# Patient Record
(demographics unavailable — no encounter records)

---

## 2024-10-23 NOTE — PHYSICAL EXAM
[Normal Sclera/Conjunctiva] : normal sclera/conjunctiva [No Edema] : there was no peripheral edema [No Extremity Clubbing/Cyanosis] : no extremity clubbing/cyanosis [Normal Gait] : normal gait [Normal] : affect was normal and insight and judgment were intact [No Lymphadenopathy] : no lymphadenopathy [Supple] : supple [de-identified] : thyroid enlarged [de-identified] : firm nodule to mid upper back, C/D/I no erythema or edema

## 2024-10-23 NOTE — HISTORY OF PRESENT ILLNESS
[FreeTextEntry1] : f/u [de-identified] : Patient here today for f/u   Patient reports inflammation in neck/pain, cant find trigger. She reports getting dizzy, lightheaded, and feeling flushed. She lasts few mins-hours, happening weekly. She reports happening about the same. Wants to check out.   She reports lump on back for a long time as long as she can remember (at least 1 year). She wants to know what it is   Gender Dysphoria: Patient started late jan 2024 on gaht. Has been going well, no side effects. She has experienced continued physical changes with gaht. Patient is feeling great, no side effects   SH: She reports she is making career transition, might move to [place] in several months, will keep us posted. She reports not finalized yet but will let us know if moving -- waiting on job to go through, has been delayed been in process. Partner last visited in may  Sexual Hx: Relationship status: They have been in a long-distance relationship monag relationship for a few months and are not currently sexually active. Partners (tell me about the genders and bodies of partners, any sperm producing partners): trans female Practices (types of sex, monogamous/polyamorous): n/a not currently sexually active Protections from STIs (condoms, OCP, LARC, PrEP, etc.): counseled on condoms Past Hx of STIs: none Pregnancy (Desire or Hx): n/a LMP: n/a  Patient reports 0 sexual partners in the last 3 months. Last sexual encounter was feb 2024  Pt denies any SOB, cough, chest pain, palpitations, N/V/D/C, fever, or chills. No other health concerns today.

## 2024-10-23 NOTE — PLAN
[FreeTextEntry1] : Gender Dysphoria: stable. Will order routine blood work and f/u results to patient once made available - will send [facility] message with results and dose adjustment if needed. F/u 3 months. Advised patient to call with any questions or concerns. Patient verbalized understanding.   Enlarged thyroid: obtain us to visualize. Will order routine blood work and f/u results to patient once made available. Advised patient to call with any questions or concerns. Patient verbalized understanding.   Educated pt to go to hospital with any worst headache of life/thunderclap headache, gait disturbances, chest pain, palpitations, acute neuro changes (slurred speech, muscle weakness, numbness/tingling, visual disturbances etc.) .Advised patient to call with any questions or concerns. Patient verbalized understanding.   Sebaceous cyst: advised no intervention needed at this time as patient not having any s/s. Advised if any signs of infection/erythema/edema etc. f/u for abx and possible i&d. Advised patient to call with any questions or concerns. Patient verbalized understanding.

## 2024-12-19 NOTE — HISTORY OF PRESENT ILLNESS
[de-identified] : Here for f/u Moving to [place] in 2 weeks for job relocation and to be closer to partner. She is an , looking to be in   Has been on HRT x 1 year Interested to start progesterone too  On 6 mg total estradiol daily and 100 mg alfa - labs had been at goal, patient is happy   Had flu shot this year already

## 2024-12-19 NOTE — ASSESSMENT
[FreeTextEntry1] : -- Labs today -- Start prog 100 mg can titrate to 200 as tolerated -- Continue current dosing pending labs -- Pt moving will send bridge RX if needed, may be able to f/u here due to frequent visits, pending insurance coverage w new job  RTC 3 months if not transferring care   Time spent on visit includes review of prior medical records, review of relevant lab results, review of imaging, review of specialist notes, forms/letters as applicable, and documentation.